# Patient Record
Sex: MALE | Employment: FULL TIME | URBAN - METROPOLITAN AREA
[De-identification: names, ages, dates, MRNs, and addresses within clinical notes are randomized per-mention and may not be internally consistent; named-entity substitution may affect disease eponyms.]

---

## 2017-02-16 DIAGNOSIS — Z20.828 EXPOSURE TO INFLUENZA: Primary | ICD-10-CM

## 2017-02-16 RX ORDER — OSELTAMIVIR PHOSPHATE 75 MG/1
75 CAPSULE ORAL DAILY
Qty: 10 CAPSULE | Refills: 0 | Status: SHIPPED | OUTPATIENT
Start: 2017-02-16 | End: 2017-02-26

## 2024-03-31 ENCOUNTER — HOSPITAL ENCOUNTER (OUTPATIENT)
Facility: CLINIC | Age: 34
Setting detail: OBSERVATION
Discharge: HOME | End: 2024-04-01
Attending: EMERGENCY MEDICINE | Admitting: INTERNAL MEDICINE
Payer: COMMERCIAL

## 2024-03-31 DIAGNOSIS — M62.830 SPASM OF MUSCLE OF LOWER BACK: ICD-10-CM

## 2024-03-31 DIAGNOSIS — M54.50 ACUTE MIDLINE LOW BACK PAIN WITHOUT SCIATICA: Primary | ICD-10-CM

## 2024-03-31 PROCEDURE — 99285 EMERGENCY DEPT VISIT HI MDM: CPT | Performed by: EMERGENCY MEDICINE

## 2024-03-31 PROCEDURE — 99285 EMERGENCY DEPT VISIT HI MDM: CPT | Mod: 25 | Performed by: EMERGENCY MEDICINE

## 2024-03-31 PROCEDURE — 96374 THER/PROPH/DIAG INJ IV PUSH: CPT | Performed by: EMERGENCY MEDICINE

## 2024-03-31 PROCEDURE — 250N000013 HC RX MED GY IP 250 OP 250 PS 637: Performed by: EMERGENCY MEDICINE

## 2024-03-31 PROCEDURE — 96361 HYDRATE IV INFUSION ADD-ON: CPT | Performed by: EMERGENCY MEDICINE

## 2024-03-31 PROCEDURE — G0378 HOSPITAL OBSERVATION PER HR: HCPCS

## 2024-03-31 PROCEDURE — 96375 TX/PRO/DX INJ NEW DRUG ADDON: CPT | Performed by: EMERGENCY MEDICINE

## 2024-03-31 PROCEDURE — 258N000003 HC RX IP 258 OP 636: Performed by: EMERGENCY MEDICINE

## 2024-03-31 PROCEDURE — 250N000011 HC RX IP 250 OP 636: Performed by: EMERGENCY MEDICINE

## 2024-03-31 RX ORDER — KETOROLAC TROMETHAMINE 15 MG/ML
10 INJECTION, SOLUTION INTRAMUSCULAR; INTRAVENOUS
Status: COMPLETED | OUTPATIENT
Start: 2024-03-31 | End: 2024-03-31

## 2024-03-31 RX ORDER — DEXAMETHASONE SODIUM PHOSPHATE 10 MG/ML
10 INJECTION, SOLUTION INTRAMUSCULAR; INTRAVENOUS ONCE
Status: COMPLETED | OUTPATIENT
Start: 2024-03-31 | End: 2024-03-31

## 2024-03-31 RX ORDER — SODIUM CHLORIDE, SODIUM LACTATE, POTASSIUM CHLORIDE, CALCIUM CHLORIDE 600; 310; 30; 20 MG/100ML; MG/100ML; MG/100ML; MG/100ML
1000 INJECTION, SOLUTION INTRAVENOUS CONTINUOUS
Status: DISCONTINUED | OUTPATIENT
Start: 2024-03-31 | End: 2024-04-01

## 2024-03-31 RX ORDER — DIAZEPAM 5 MG
5 TABLET ORAL
Status: COMPLETED | OUTPATIENT
Start: 2024-03-31 | End: 2024-03-31

## 2024-03-31 RX ORDER — OXYCODONE HYDROCHLORIDE 5 MG/1
5 TABLET ORAL EVERY 6 HOURS PRN
Qty: 10 TABLET | Refills: 0 | Status: SHIPPED | OUTPATIENT
Start: 2024-03-31 | End: 2024-04-01

## 2024-03-31 RX ORDER — PREDNISONE 20 MG/1
TABLET ORAL
Qty: 5 TABLET | Refills: 0 | Status: SHIPPED | OUTPATIENT
Start: 2024-03-31 | End: 2024-04-07

## 2024-03-31 RX ORDER — HYDROMORPHONE HYDROCHLORIDE 1 MG/ML
0.5 INJECTION, SOLUTION INTRAMUSCULAR; INTRAVENOUS; SUBCUTANEOUS EVERY 30 MIN PRN
Status: COMPLETED | OUTPATIENT
Start: 2024-03-31 | End: 2024-03-31

## 2024-03-31 RX ADMIN — HYDROMORPHONE HYDROCHLORIDE 0.5 MG: 1 INJECTION, SOLUTION INTRAMUSCULAR; INTRAVENOUS; SUBCUTANEOUS at 22:31

## 2024-03-31 RX ADMIN — SODIUM CHLORIDE, POTASSIUM CHLORIDE, SODIUM LACTATE AND CALCIUM CHLORIDE 1000 ML: 600; 310; 30; 20 INJECTION, SOLUTION INTRAVENOUS at 20:55

## 2024-03-31 RX ADMIN — DIAZEPAM 5 MG: 5 TABLET ORAL at 20:54

## 2024-03-31 RX ADMIN — KETOROLAC TROMETHAMINE 10 MG: 15 INJECTION, SOLUTION INTRAMUSCULAR; INTRAVENOUS at 20:55

## 2024-03-31 RX ADMIN — HYDROMORPHONE HYDROCHLORIDE 0.5 MG: 1 INJECTION, SOLUTION INTRAMUSCULAR; INTRAVENOUS; SUBCUTANEOUS at 22:03

## 2024-03-31 RX ADMIN — DEXAMETHASONE SODIUM PHOSPHATE 10 MG: 10 INJECTION, SOLUTION INTRAMUSCULAR; INTRAVENOUS at 20:54

## 2024-03-31 ASSESSMENT — ENCOUNTER SYMPTOMS
RESPIRATORY NEGATIVE: 1
CONSTITUTIONAL NEGATIVE: 1
BACK PAIN: 1
ALLERGIC/IMMUNOLOGIC NEGATIVE: 1
ENDOCRINE NEGATIVE: 1
GASTROINTESTINAL NEGATIVE: 1
PSYCHIATRIC NEGATIVE: 1
CARDIOVASCULAR NEGATIVE: 1
HEMATOLOGIC/LYMPHATIC NEGATIVE: 1
EYES NEGATIVE: 1
NEUROLOGICAL NEGATIVE: 1

## 2024-03-31 ASSESSMENT — ACTIVITIES OF DAILY LIVING (ADL)
ADLS_ACUITY_SCORE: 35
ADLS_ACUITY_SCORE: 33
ADLS_ACUITY_SCORE: 35
ADLS_ACUITY_SCORE: 33
ADLS_ACUITY_SCORE: 35

## 2024-03-31 ASSESSMENT — COLUMBIA-SUICIDE SEVERITY RATING SCALE - C-SSRS
6. HAVE YOU EVER DONE ANYTHING, STARTED TO DO ANYTHING, OR PREPARED TO DO ANYTHING TO END YOUR LIFE?: NO
2. HAVE YOU ACTUALLY HAD ANY THOUGHTS OF KILLING YOURSELF IN THE PAST MONTH?: NO
1. IN THE PAST MONTH, HAVE YOU WISHED YOU WERE DEAD OR WISHED YOU COULD GO TO SLEEP AND NOT WAKE UP?: NO

## 2024-03-31 NOTE — ED PROVIDER NOTES
History     Chief Complaint   Patient presents with    Back Pain     HPI  Kristian Dumont is a 33 year old male who presents with low back pain that started 7 hours after lifting a his daughter's bike off the garage wall by report    On examination patient was accompanied by his wife reporting that earlier this morning around 9 AM he was trying to retrieve his dog's bicycle from the garage when he bent over but was in the waiting and tweaked his back.  He has had a history of lumbar back pain but no prior surgery or epidural steroid injection.  He has methocarbamol at home to manage symptoms.  Patient reports since tweaking his back earlier this morning he has had back spasms.  No leg numbness or weakness and no fall onto his back.  He required assist with his spouse and children to help him get to the bathroom due to pain and spasm but was able to void without difficulty.  No perianal anesthesia.    Allergies:  No Known Allergies    Problem List:    There are no problems to display for this patient.       Past Medical History:    No past medical history on file.    Past Surgical History:    No past surgical history on file.    Family History:    No family history on file.    Social History:  Marital Status:   [2]        Medications:    oxyCODONE (ROXICODONE) 5 MG tablet  predniSONE (DELTASONE) 20 MG tablet          Review of Systems   Constitutional: Negative.    HENT: Negative.     Eyes: Negative.    Respiratory: Negative.     Cardiovascular: Negative.    Gastrointestinal: Negative.    Endocrine: Negative.    Genitourinary: Negative.    Musculoskeletal:  Positive for back pain (and back spasm).   Skin: Negative.    Allergic/Immunologic: Negative.    Neurological: Negative.    Hematological: Negative.    Psychiatric/Behavioral: Negative.     All other systems reviewed and are negative.      Physical Exam   BP: 121/82  Pulse: 74  Temp: 97  F (36.1  C)  Resp: 18  Weight: 99.8 kg (220 lb)  SpO2: 95  %      Physical Exam  Constitutional:       General: He is not in acute distress.     Appearance: He is not ill-appearing, toxic-appearing or diaphoretic.   HENT:      Head: Normocephalic and atraumatic.      Nose: Nose normal.   Eyes:      Extraocular Movements: Extraocular movements intact.      Pupils: Pupils are equal, round, and reactive to light.   Cardiovascular:      Pulses: Normal pulses.   Pulmonary:      Effort: Pulmonary effort is normal. No respiratory distress.      Breath sounds: Normal breath sounds. No stridor. No wheezing, rhonchi or rales.   Chest:      Chest wall: No tenderness.   Musculoskeletal:         General: Signs of injury present.      Cervical back: Normal range of motion and neck supple.      Thoracic back: Spasms and tenderness present.        Back:    Skin:     Capillary Refill: Capillary refill takes less than 2 seconds.      Coloration: Skin is not pale.      Findings: No bruising, erythema, lesion or rash.   Neurological:      General: No focal deficit present.      Mental Status: He is alert and oriented to person, place, and time.      Cranial Nerves: No cranial nerve deficit.      Sensory: No sensory deficit.      Motor: No weakness.      Coordination: Coordination normal.      Deep Tendon Reflexes: Reflexes normal.   Psychiatric:         Mood and Affect: Mood normal.         Behavior: Behavior normal.         Thought Content: Thought content normal.         Judgment: Judgment normal.         ED Course        Procedures              Critical Care time:  none               ED medications:  Medications   lactated ringers infusion 1,000 mL (has no administration in time range)   HYDROmorphone (PF) (DILAUDID) injection 0.5 mg (0.5 mg Intravenous $Given 3/31/24 2203)   lactated ringers BOLUS 1,000 mL (1,000 mLs Intravenous $New Bag 3/31/24 2055)   dexAMETHasone PF (DECADRON) injection 10 mg (10 mg Intravenous $Given 3/31/24 2054)   ketorolac (TORADOL) injection 10 mg (10 mg  Intravenous $Given 3/31/24 2055)   diazepam (VALIUM) tablet 5 mg (5 mg Oral $Given 3/31/24 2054)        ED Vitals:  Vitals:    03/31/24 1636   BP: 121/82   Pulse: 74   Resp: 18   Temp: 97  F (36.1  C)   TempSrc: Tympanic   SpO2: 95%   Weight: 99.8 kg (220 lb)      ED labs and imaging: none      Assessments & Plan (with Medical Decision Making)   Assessment summary and Clinical Impression: 33-year-old male who presented with acute low back pain after lifting his daughter's bicycle of the garage wall.  He afebrile and otherwise hemodynamically normal on arrival.  Patient reported that he has had back spasm with back pain for quite some time but no prior epidural steroid injection or back surgery.  When he has back spasms or flares he typically manages symptoms methocarbamol.  Since this morning 9 AM he has had persistent spasm and pain.  No lower extremity weakness or numbness.  He voided spontaneously without difficulty on exam there is reproducible.  Thoracolumbar tenderness.  Mild.  There is no rash.  He has equal and symmetric lower extremity strength we agreed to give him medications to manage his back spasm and pain. Suspicion that he has a emergent spinal epidural process was low  We agreed that there was no indication for spine imaging.  After medications given to manage symptoms he expressed comfort trialing care at home but ultimately was not able to go home because his wife was worried about fall risk and patient was not able to ambulate independently due to pain and spasm.  He is admitted to medicine for pain management plan with discussion about thoracolumbar imaging depending on patient's hospital course and symptoms.    At shift end patient was awaiting transfer to the medical floor for on-going care.    ED course and Plan:  Reviewed the medical record.  Patient was offered medications to manage his back spasm and pain.  Given absence of red flags including no fall, no extremity weakness or numbness no  fever or chills and no perianal anesthesia he was offered medications to manage his symptoms. After therapies given to manage his back pain and spasm patient was able to pivot in bed and expressed that he was comfortable going home with plan to trial care at home with pain management plan including Tylenol, methocarbamol, Motrin or ibuprofen, oxycodone and a prednisone taper.  He reported that he has lidocaine patches at home.  To help with follow-up care if needed I placed a referral to the spine clinic for reassessment in the next 1 to 2 weeks if symptoms persist.  Reviewed concerning symptoms including leg weakness with numbness, perianal anesthesia, difficulty urinating or defecating or new concerns or fever.  Patient and spouse expressed comfort, understanding and agreement with plan of care.      ADDENDUM- on 3/31/24- patient was readied for discharge but continued to have spasm . He was able to pivot in bed and stand but was apprehensive about walking. Spouse expressed reservation about taking patient home and his risk for fall.  His pain and spasms improved but he was still admitted to walk off the bed but was able to pivot and stand up.  We ultimately agreed to admit him for further care due to fall risk.    Spoke with ROBERT Garland PA-C- admitting provider at 11.10pm who agreed to accept patient for further care. We reviewed rationale for admission including pain management needs and consideration of MRI imaging of the thoracolumbar spine to assess for disc herniation in the a.m. if persistent symptoms.  Plan is for the admitting team or provider to decide on imaging depending on hospital course of care.     At shift end patient signed out to the overnight provider with patient awaiting transfer to the medical floor for further care.      Disclaimer: This note consists of symbols derived from keyboarding, dictation and/or voice recognition software. As a result, there may be errors in the script that have  gone undetected. Please consider this when interpreting information found in this chart.   I have reviewed the nursing notes.    I have reviewed the findings, diagnosis, plan and need for follow up with the patient.           Medical Decision Making  The patient's presentation was of moderate complexity (acute low back pain).    The patient's evaluation involved:  history and exam without other MDM data elements    The patient's management necessitated moderate risk (prescription drug management including medications given in the ED).        New Prescriptions    OXYCODONE (ROXICODONE) 5 MG TABLET    Take 1 tablet (5 mg) by mouth every 6 hours as needed for severe pain    PREDNISONE (DELTASONE) 20 MG TABLET    1 tab daily for 3 days, then 1/2 tab daily for 4 days       Final diagnoses:   Acute midline low back pain without sciatica   Spasm of muscle of lower back       3/31/2024   [facility] EMERGENCY DEPT       Vasu Skinner MD  03/31/24 1161       Vasu Skinner MD  03/31/24 5584

## 2024-03-31 NOTE — ED TRIAGE NOTES
Lower back pain that started approximately 7 hours ago after lifting daughter's bike off of garage wall.       Triage Assessment (Adult)       Row Name 03/31/24 7680          Triage Assessment    Airway WDL WDL        Respiratory WDL    Respiratory WDL WDL        Skin Circulation/Temperature WDL    Skin Circulation/Temperature WDL WDL        Cardiac WDL    Cardiac WDL WDL        Peripheral/Neurovascular WDL    Peripheral Neurovascular WDL WDL        Cognitive/Neuro/Behavioral WDL    Cognitive/Neuro/Behavioral WDL WDL

## 2024-04-01 ENCOUNTER — APPOINTMENT (OUTPATIENT)
Dept: MRI IMAGING | Facility: CLINIC | Age: 34
End: 2024-04-01
Attending: INTERNAL MEDICINE
Payer: COMMERCIAL

## 2024-04-01 VITALS
TEMPERATURE: 98 F | BODY MASS INDEX: 29.89 KG/M2 | DIASTOLIC BLOOD PRESSURE: 76 MMHG | SYSTOLIC BLOOD PRESSURE: 132 MMHG | OXYGEN SATURATION: 94 % | HEART RATE: 96 BPM | HEIGHT: 72 IN | WEIGHT: 220.68 LBS | RESPIRATION RATE: 18 BRPM

## 2024-04-01 PROBLEM — M51.26 LUMBAR DISC HERNIATION: Status: ACTIVE | Noted: 2024-04-01

## 2024-04-01 LAB
ANION GAP SERPL CALCULATED.3IONS-SCNC: 8 MMOL/L (ref 7–15)
BUN SERPL-MCNC: 14.1 MG/DL (ref 6–20)
CALCIUM SERPL-MCNC: 9.5 MG/DL (ref 8.6–10)
CHLORIDE SERPL-SCNC: 103 MMOL/L (ref 98–107)
CREAT SERPL-MCNC: 0.77 MG/DL (ref 0.67–1.17)
DEPRECATED HCO3 PLAS-SCNC: 28 MMOL/L (ref 22–29)
EGFRCR SERPLBLD CKD-EPI 2021: >90 ML/MIN/1.73M2
ERYTHROCYTE [DISTWIDTH] IN BLOOD BY AUTOMATED COUNT: 12.3 % (ref 10–15)
GLUCOSE SERPL-MCNC: 159 MG/DL (ref 70–99)
HCT VFR BLD AUTO: 46.8 % (ref 40–53)
HGB BLD-MCNC: 16.6 G/DL (ref 13.3–17.7)
MCH RBC QN AUTO: 30.2 PG (ref 26.5–33)
MCHC RBC AUTO-ENTMCNC: 35.5 G/DL (ref 31.5–36.5)
MCV RBC AUTO: 85 FL (ref 78–100)
PLATELET # BLD AUTO: 259 10E3/UL (ref 150–450)
POTASSIUM SERPL-SCNC: 4.3 MMOL/L (ref 3.4–5.3)
RBC # BLD AUTO: 5.49 10E6/UL (ref 4.4–5.9)
SODIUM SERPL-SCNC: 139 MMOL/L (ref 135–145)
WBC # BLD AUTO: 12.1 10E3/UL (ref 4–11)

## 2024-04-01 PROCEDURE — G0378 HOSPITAL OBSERVATION PER HR: HCPCS

## 2024-04-01 PROCEDURE — 99207 PR APP CREDIT; MD BILLING SHARED VISIT: CPT | Mod: FS | Performed by: STUDENT IN AN ORGANIZED HEALTH CARE EDUCATION/TRAINING PROGRAM

## 2024-04-01 PROCEDURE — 99207 PR APP CREDIT; MD BILLING SHARED VISIT: CPT

## 2024-04-01 PROCEDURE — 250N000013 HC RX MED GY IP 250 OP 250 PS 637

## 2024-04-01 PROCEDURE — 72148 MRI LUMBAR SPINE W/O DYE: CPT

## 2024-04-01 PROCEDURE — 250N000013 HC RX MED GY IP 250 OP 250 PS 637: Performed by: INTERNAL MEDICINE

## 2024-04-01 PROCEDURE — 250N000012 HC RX MED GY IP 250 OP 636 PS 637: Performed by: INTERNAL MEDICINE

## 2024-04-01 PROCEDURE — 80048 BASIC METABOLIC PNL TOTAL CA: CPT

## 2024-04-01 PROCEDURE — 96376 TX/PRO/DX INJ SAME DRUG ADON: CPT

## 2024-04-01 PROCEDURE — 85027 COMPLETE CBC AUTOMATED: CPT

## 2024-04-01 PROCEDURE — 36415 COLL VENOUS BLD VENIPUNCTURE: CPT

## 2024-04-01 PROCEDURE — 250N000011 HC RX IP 250 OP 636: Performed by: EMERGENCY MEDICINE

## 2024-04-01 PROCEDURE — 258N000003 HC RX IP 258 OP 636: Performed by: EMERGENCY MEDICINE

## 2024-04-01 PROCEDURE — 250N000011 HC RX IP 250 OP 636: Performed by: INTERNAL MEDICINE

## 2024-04-01 PROCEDURE — 99222 1ST HOSP IP/OBS MODERATE 55: CPT | Mod: VID | Performed by: INTERNAL MEDICINE

## 2024-04-01 RX ORDER — METHOCARBAMOL 500 MG/1
500 TABLET, FILM COATED ORAL 4 TIMES DAILY PRN
Status: DISCONTINUED | OUTPATIENT
Start: 2024-04-01 | End: 2024-04-01 | Stop reason: HOSPADM

## 2024-04-01 RX ORDER — ONDANSETRON 4 MG/1
4 TABLET, ORALLY DISINTEGRATING ORAL EVERY 6 HOURS PRN
Status: DISCONTINUED | OUTPATIENT
Start: 2024-04-01 | End: 2024-04-01 | Stop reason: HOSPADM

## 2024-04-01 RX ORDER — METHOCARBAMOL 500 MG/1
500 TABLET, FILM COATED ORAL 4 TIMES DAILY PRN
COMMUNITY

## 2024-04-01 RX ORDER — OXYCODONE HYDROCHLORIDE 5 MG/1
5-10 TABLET ORAL EVERY 6 HOURS PRN
Qty: 20 TABLET | Refills: 0 | Status: SHIPPED | OUTPATIENT
Start: 2024-04-01

## 2024-04-01 RX ORDER — KETOROLAC TROMETHAMINE 30 MG/ML
30 INJECTION, SOLUTION INTRAMUSCULAR; INTRAVENOUS EVERY 6 HOURS PRN
Status: DISCONTINUED | OUTPATIENT
Start: 2024-04-01 | End: 2024-04-01 | Stop reason: HOSPADM

## 2024-04-01 RX ORDER — IBUPROFEN 600 MG/1
600 TABLET, FILM COATED ORAL EVERY 6 HOURS PRN
Qty: 60 TABLET | Refills: 0 | Status: SHIPPED | OUTPATIENT
Start: 2024-04-01

## 2024-04-01 RX ORDER — NALOXONE HYDROCHLORIDE 0.4 MG/ML
0.2 INJECTION, SOLUTION INTRAMUSCULAR; INTRAVENOUS; SUBCUTANEOUS
Status: DISCONTINUED | OUTPATIENT
Start: 2024-04-01 | End: 2024-04-01 | Stop reason: HOSPADM

## 2024-04-01 RX ORDER — HYDROMORPHONE HYDROCHLORIDE 1 MG/ML
0.5 INJECTION, SOLUTION INTRAMUSCULAR; INTRAVENOUS; SUBCUTANEOUS
Status: DISCONTINUED | OUTPATIENT
Start: 2024-04-01 | End: 2024-04-01 | Stop reason: HOSPADM

## 2024-04-01 RX ORDER — CALCIUM CARBONATE 500 MG/1
1000 TABLET, CHEWABLE ORAL 4 TIMES DAILY PRN
Status: DISCONTINUED | OUTPATIENT
Start: 2024-04-01 | End: 2024-04-01

## 2024-04-01 RX ORDER — AMOXICILLIN 250 MG
2 CAPSULE ORAL 2 TIMES DAILY PRN
Status: DISCONTINUED | OUTPATIENT
Start: 2024-04-01 | End: 2024-04-01 | Stop reason: HOSPADM

## 2024-04-01 RX ORDER — ACETAMINOPHEN 325 MG/1
650 TABLET ORAL EVERY 4 HOURS PRN
Status: DISCONTINUED | OUTPATIENT
Start: 2024-04-01 | End: 2024-04-01 | Stop reason: HOSPADM

## 2024-04-01 RX ORDER — OXYCODONE HYDROCHLORIDE 5 MG/1
10 TABLET ORAL ONCE
Status: COMPLETED | OUTPATIENT
Start: 2024-04-01 | End: 2024-04-01

## 2024-04-01 RX ORDER — ONDANSETRON 2 MG/ML
4 INJECTION INTRAMUSCULAR; INTRAVENOUS EVERY 6 HOURS PRN
Status: DISCONTINUED | OUTPATIENT
Start: 2024-04-01 | End: 2024-04-01 | Stop reason: HOSPADM

## 2024-04-01 RX ORDER — NALOXONE HYDROCHLORIDE 0.4 MG/ML
0.4 INJECTION, SOLUTION INTRAMUSCULAR; INTRAVENOUS; SUBCUTANEOUS
Status: DISCONTINUED | OUTPATIENT
Start: 2024-04-01 | End: 2024-04-01 | Stop reason: HOSPADM

## 2024-04-01 RX ORDER — PANTOPRAZOLE SODIUM 40 MG/1
40 TABLET, DELAYED RELEASE ORAL DAILY
Status: DISCONTINUED | OUTPATIENT
Start: 2024-04-01 | End: 2024-04-01 | Stop reason: HOSPADM

## 2024-04-01 RX ORDER — AMOXICILLIN 250 MG
1 CAPSULE ORAL 2 TIMES DAILY PRN
Status: DISCONTINUED | OUTPATIENT
Start: 2024-04-01 | End: 2024-04-01 | Stop reason: HOSPADM

## 2024-04-01 RX ORDER — PREDNISONE 10 MG/1
10 TABLET ORAL DAILY
Status: DISCONTINUED | OUTPATIENT
Start: 2024-04-01 | End: 2024-04-01 | Stop reason: HOSPADM

## 2024-04-01 RX ORDER — ACETAMINOPHEN 325 MG/1
650 TABLET ORAL EVERY 4 HOURS PRN
Qty: 60 TABLET | Refills: 0 | Status: SHIPPED | OUTPATIENT
Start: 2024-04-01

## 2024-04-01 RX ORDER — LORAZEPAM 2 MG/ML
1 INJECTION INTRAMUSCULAR EVERY 4 HOURS PRN
Status: DISCONTINUED | OUTPATIENT
Start: 2024-04-01 | End: 2024-04-01

## 2024-04-01 RX ORDER — LORAZEPAM 1 MG/1
1 TABLET ORAL EVERY 4 HOURS PRN
Status: DISCONTINUED | OUTPATIENT
Start: 2024-04-01 | End: 2024-04-01

## 2024-04-01 RX ORDER — ACETAMINOPHEN 325 MG/1
650 TABLET ORAL EVERY 4 HOURS PRN
Status: DISCONTINUED | OUTPATIENT
Start: 2024-04-01 | End: 2024-04-01

## 2024-04-01 RX ADMIN — METHOCARBAMOL 500 MG: 500 TABLET ORAL at 08:58

## 2024-04-01 RX ADMIN — KETOROLAC TROMETHAMINE 30 MG: 30 INJECTION, SOLUTION INTRAMUSCULAR; INTRAVENOUS at 05:02

## 2024-04-01 RX ADMIN — PANTOPRAZOLE SODIUM 40 MG: 40 TABLET, DELAYED RELEASE ORAL at 08:58

## 2024-04-01 RX ADMIN — ACETAMINOPHEN 650 MG: 325 TABLET, FILM COATED ORAL at 12:54

## 2024-04-01 RX ADMIN — HYDROMORPHONE HYDROCHLORIDE 1 MG: 1 INJECTION, SOLUTION INTRAMUSCULAR; INTRAVENOUS; SUBCUTANEOUS at 01:01

## 2024-04-01 RX ADMIN — SODIUM CHLORIDE, POTASSIUM CHLORIDE, SODIUM LACTATE AND CALCIUM CHLORIDE 1000 ML: 600; 310; 30; 20 INJECTION, SOLUTION INTRAVENOUS at 03:47

## 2024-04-01 RX ADMIN — PREDNISONE 10 MG: 10 TABLET ORAL at 08:58

## 2024-04-01 RX ADMIN — ACETAMINOPHEN 650 MG: 325 TABLET, FILM COATED ORAL at 05:02

## 2024-04-01 RX ADMIN — OXYCODONE HYDROCHLORIDE 10 MG: 5 TABLET ORAL at 12:54

## 2024-04-01 RX ADMIN — KETOROLAC TROMETHAMINE 30 MG: 30 INJECTION, SOLUTION INTRAMUSCULAR; INTRAVENOUS at 12:55

## 2024-04-01 ASSESSMENT — ACTIVITIES OF DAILY LIVING (ADL)
ADLS_ACUITY_SCORE: 20
ADLS_ACUITY_SCORE: 22
ADLS_ACUITY_SCORE: 23
ADLS_ACUITY_SCORE: 23
ADLS_ACUITY_SCORE: 22
ADLS_ACUITY_SCORE: 23
ADLS_ACUITY_SCORE: 20
ADLS_ACUITY_SCORE: 20
ADLS_ACUITY_SCORE: 22
ADLS_ACUITY_SCORE: 20
ADLS_ACUITY_SCORE: 20
ADLS_ACUITY_SCORE: 22
ADLS_ACUITY_SCORE: 22

## 2024-04-01 NOTE — PROGRESS NOTES
[place] [facility] [place]    Patient discharged to home at 2:35 PM via wheel chair. Patient reported adequate pain control stating pain improved since admission and positioning from lying/sitting to standing was more tolerable. Discussed pain management plan for home with patient and spouse during discharge education. Patient was able to ambulate to bathroom and out to stringer placing self in wheelchair with standy by assist. Patient was accompanied by spouse and staff and discharge instructions were reviewed with patient and spouse, opportunity offered to ask questions. Prescriptions sent to patients preferred pharmacy. All belongings sent with patient.     Jessica Avitia RN

## 2024-04-01 NOTE — PLAN OF CARE
Problem: Adult Inpatient Plan of Care  Goal: Absence of Hospital-Acquired Illness or Injury  Intervention: Identify and Manage Fall Risk  Recent Flowsheet Documentation  Taken 4/1/2024 0500 by Juan R Sparrow RN  Safety Promotion/Fall Prevention:   activity supervised   lighting adjusted   mobility aid in reach   nonskid shoes/slippers when out of bed  Intervention: Prevent Skin Injury  Recent Flowsheet Documentation  Taken 4/1/2024 0500 by Juan R Sparrow RN  Body Position: position changed independently  Intervention: Prevent and Manage VTE (Venous Thromboembolism) Risk  Recent Flowsheet Documentation  Taken 4/1/2024 0500 by Juan R Sparrow RN  VTE Prevention/Management: SCDs (sequential compression devices) off  Intervention: Prevent Infection  Recent Flowsheet Documentation  Taken 4/1/2024 0500 by Juan R Sparrow RN  Infection Prevention:   environmental surveillance performed   hand hygiene promoted   rest/sleep promoted  Goal: Optimal Comfort and Wellbeing  Intervention: Monitor Pain and Promote Comfort  Recent Flowsheet Documentation  Taken 4/1/2024 0359 by Juan R Sparrow RN  Pain Management Interventions:   emotional support   rest   repositioned   medication (see MAR)  Taken 4/1/2024 0116 by Juan R Sparrow RN  Pain Management Interventions:   emotional support   rest   repositioned   medication (see MAR)     Problem: Pain Acute  Goal: Optimal Pain Control and Function  Intervention: Develop Pain Management Plan  Recent Flowsheet Documentation  Taken 4/1/2024 0359 by Juan R Sparrow RN  Pain Management Interventions:   emotional support   rest   repositioned   medication (see MAR)  Taken 4/1/2024 0116 by Juan R Sparrow RN  Pain Management Interventions:   emotional support   rest   repositioned   medication (see MAR)  Intervention: Prevent or Manage Pain  Recent Flowsheet Documentation  Taken 4/1/2024 0500 by Juan R Sparrow RN  Medication Review/Management: medications reviewed       Goal Outcome Evaluation:    Patient  alert and oriented x4. Stand by assist of one with transfer. Patient mobility moderately impaired due to shooting pain in low mid back 7/10 with movement. PRN toradol, dilaudid and tylenol alternated overnight for pain management, heat pack provided. Patient on regular diet. PIV on R AC SL, CDI. Urinal at bedside. VS every four hours. Lumbar spine MRI ordered in AM. PCDs on. Will continue to monitor.    Juan R Sparrow RN

## 2024-04-01 NOTE — DISCHARGE SUMMARY
"[facility]  Discharge Summary  Hospital Medicine       Date of Admission:  3/31/2024  Date of Discharge:  4/1/2024  Discharging Provider: Armando Simon PA-C      Identification and Chief Compaint: Kristian Dumont is a 33 year old male who presented on 3/31/2024 with complaint of acute onset low back pain.    Discharge Diagnoses   Principal Problem:    Lumbar disc herniation  Active Problems:    Acute midline low back pain without sciatica    Spasm of muscle of lower back       Follow-ups Needed After Discharge   Follow-up Appointments     Follow-up and recommended labs and tests       Follow up with primary care provider, [facility], within 7   days for hospital follow- up.  No follow up labs or test are needed.  Follow up with PT and Ortho.        Additional follow-up instructions/to-do's for PCP: referred to PT and ortho. CBC for leukocytosis that was probably steroid induced.    Unresulted Labs Ordered in the Past 30 Days of this Admission       No orders found for last 31 day(s).          Hospital Course   Kristian Dumont is a 33 year old male with a past medical hx of GERD admitted on 3/31/2024. He presented with acute lower back pain after lifting his daughters bike out of the garage. No red flag symptoms, but stated his left lower leg felt \"weird\" but denied numbness/tingling or weakness. Has a hx of LBP 5yrs ago and was told he tore a ligament. This has limited his exercise capacity since then. MRI showed central disc protrusion at L5-S1. He will be discharged with a Prednisone taper, Oxycodone, alternating Tylenol and Ibuprofen, and Methocarbamol. Referral for PT and ortho sent.     Lumbar disc herniation of L5-S1  He presented with lower back pain after lifting his daughter's bike out of the garage. States he felt a pop and jolt. Pain is sharp and midline. No radiculopathy, fever, paraspinal tenderness, IVDA. Endorses his left lower leg feeling \"weird\" but denies " numbness/tingling and weakness. He has a hx of LBP 5yrs ago where he was told he had a torn ligament. This never fully resolved and currently limits his exercise (previously did ultra marathons). He was given extensive treatment in the ED with narcotics and steroids, but when he tried to leave his pain was still too much, so he was admitted for pain management. MRI showed a central disc protrusion at L5-S1 with central annular fissuring of the disc. He was given 10mg IV Dexamethasone in the ER and will taper with PO Prednisone.    - Oxycodone 5mg 1-2 tablets Q6h  - Alternate Tylenol and Ibuprofen  - Continue PTA Methocarbamol 500mg QID PRN  - Prednisone PO 20mg for 3d then 10mg for 4d  - Referrals sent to ortho and PT     GERD  Continued PTA Omeprazole 20          Leukocytosis  WBC 12.1 on 4/1 am. Likely 2/2 to steroids that were given in ER. No signs of infection. Afebrile.     Consultations This Hospital Stay   None    Code Status   Full Code    The discharge plan was discussed with the patient, his wife, and they expressed understanding.     Time Spent on this Encounter   Total time on this discharge was 35 minutes.       Armando Simon PA-C  [facility]  ______________________________________________________________________    Physical Exam   Vital Signs: Temp: 98  F (36.7  C) Temp src: Oral BP: 132/76 Pulse: 96   Resp: 18 SpO2: 94 % O2 Device: None (Room air)    Weight: 220 lbs 10.89 oz    Constitutional: Alert, oriented, cooperative, in no acute distress, appears nontoxic.  HENT: Oropharynx is clear. No evidence of cranial trauma. Normocephalic. Eyes anicteric.   Cardiovascular: Regular rate and rhythm, normal S1 and S2, and no murmur noted. No lower extremity edema.  Respiratory: Clear to auscultation bilaterally with no adventitious breath sounds.   GI: Soft, non-tender, normal bowel sounds, no hepatomegaly, no masses. No CVA tenderness.  Musculoskeletal: Normal muscle bulk and  tone. FROM in all extremities. 5/5 strength bilaterally. No paraspinal tenderness.  Skin: Warm and dry, no rashes.   Neurologic: Cranial nerves 2-12 are grossly intact.        Primary Care Physician   [facility]  [id number] [id number]     Discharge Disposition   Discharged to home  Condition at discharge: Stable    Significant Results and Procedures   Most Recent 3 CBC's:  Recent Labs   Lab Test 04/01/24  0636   WBC 12.1*   HGB 16.6   MCV 85        Most Recent 3 BMP's:  Recent Labs   Lab Test 04/01/24  0636      POTASSIUM 4.3   CHLORIDE 103   CO2 28   BUN 14.1   CR 0.77   ANIONGAP 8   CONOR 9.5   *   ,   Results for orders placed or performed during the hospital encounter of 03/31/24   MRI Lumbar Spine w/o Contrast    Narrative    MRI LUMBAR SPINE WITHOUT CONTRAST  April 1, 2024 9:59 AM     HISTORY: Acute lower back pain.     TECHNIQUE: Multiplanar multisequence MRI of the lumbar spine without  contrast.     COMPARISON: None.     FINDINGS: There are five lumbar-type vertebral bodies assumed for the  purposes of this dictation. The distal spinal cord and cauda equina  appear normal.     Alignment of the lumbar spine is normal. No loss of vertebral body  height. There are no destructive osseous lesions. No STIR hyperintense  endplate edema (Modic type I changes).    Level by level as follows:     T12-L1: No loss of disc height. No significant disc herniation. Normal  facets. No spinal canal or neural foraminal narrowing.     L1-L2: No loss of disc height. No significant disc herniation. Normal  facets. No spinal canal or neural foraminal narrowing.     L2-L3: No loss of disc height. No significant disc herniation. Normal  facets. No spinal canal or neural foraminal narrowing.     L3-L4: No loss of disc height. No significant disc herniation. Normal  facets. No spinal canal or neural foraminal narrowing.     L4-L5: No loss of disc height. No significant disc herniation.  Normal  facets. No spinal canal or neural foraminal narrowing.     L5-S1: Mild loss of disc height and signal. Central disc protrusion.  Central annular fissuring of the disc. Normal facets. No significant  spinal canal narrowing. No neural foraminal narrowing.     Paraspinous soft tissues: Unremarkable.       Impression    IMPRESSION:    1. Central disc protrusion at L5-S1 with central annular fissuring of  the disc. No significant spinal canal or neural foraminal narrowing at  this level.  2. No other significant degenerative changes in the lumbar spine.    ANSHUL MENDEZ MD         SYSTEM ID:  [id number]     Procedures  None    Discharge Orders      Spine  Referral      Physical Therapy  Referral      Orthopedic  Referral      Reason for your hospital stay    You were hospitalized for a disc herniation at L5-S1. Please follow up with your PCP, PT, and ortho.     Follow-up and recommended labs and tests     Follow up with primary care provider, [facility], within 7 days for hospital follow- up.  No follow up labs or test are needed.  Follow up with PT and Ortho.     Activity    Your activity upon discharge: activity as tolerated     Diet    Follow this diet upon discharge: Orders Placed This Encounter      Regular Diet Adult     Discharge Medications   Current Discharge Medication List        START taking these medications    Details   acetaminophen (TYLENOL) 325 MG tablet Take 2 tablets (650 mg) by mouth every 4 hours as needed for mild pain . Alternate with Tylenol and Ibuprofen  Qty: 60 tablet, Refills: 0    Associated Diagnoses: Acute midline low back pain without sciatica      ibuprofen (ADVIL/MOTRIN) 600 MG tablet Take 1 tablet (600 mg) by mouth every 6 hours as needed for moderate pain . Alternate Tylenol and Ibuprofen  Qty: 60 tablet, Refills: 0    Associated Diagnoses: Acute midline low back pain without sciatica      oxyCODONE (ROXICODONE) 5 MG tablet Take 1-2  tablets (5-10 mg) by mouth every 6 hours as needed for severe pain  Qty: 20 tablet, Refills: 0    Associated Diagnoses: Acute midline low back pain without sciatica      predniSONE (DELTASONE) 20 MG tablet 1 tab daily for 3 days, then 1/2 tab daily for 4 days  Qty: 5 tablet, Refills: 0           CONTINUE these medications which have NOT CHANGED    Details   methocarbamol (ROBAXIN) 500 MG tablet Take 500 mg by mouth 4 times daily as needed for muscle spasms      omeprazole (PRILOSEC) 20 MG DR capsule Take 20 mg by mouth daily           Allergies   No Known Allergies

## 2024-04-01 NOTE — ED NOTES
[facility]   Admission Handoff    The patient is Kristian Dumont, 33 year old who arrived in the ED by WHEELCHAIR from home with a complaint of Back Pain  . The patient's current symptoms are new and during this time the symptoms have decreased. In the ED, patient was diagnosed with   Final diagnoses:   Acute midline low back pain without sciatica   Spasm of muscle of lower back         Needed?: No    Allergies:  No Known Allergies    Past Medical Hx: No past medical history on file.    Initial vitals were: BP: 121/82  Pulse: 74  Temp: 97  F (36.1  C)  Resp: 18  Weight: 99.8 kg (220 lb)  SpO2: 95 %   Recent vital Signs: /82   Pulse 74   Temp 97  F (36.1  C) (Tympanic)   Resp 18   Wt 99.8 kg (220 lb)   SpO2 95%     Elimination Status: Continent: Yes     Activity Level: SBA    Fall Status: Reason for falls risk:  Mobility and Reason for falls risk: High Risk Medications  nonskid shoes/slippers when out of bed, arm band in place, patient and family education, and assistive device/personal items within reach    Baseline Mental status: WDL  Current Mental Status changes: at basesline    Infection present or suspected this encounter: no  Sepsis suspected: No    Isolation type: n/a    Bariatric equipment needed?: No    In the ED these meds were given:   Medications   lactated ringers infusion 1,000 mL (has no administration in time range)   lactated ringers BOLUS 1,000 mL (0 mLs Intravenous Stopped 3/31/24 2228)   dexAMETHasone PF (DECADRON) injection 10 mg (10 mg Intravenous $Given 3/31/24 2054)   HYDROmorphone (PF) (DILAUDID) injection 0.5 mg (0.5 mg Intravenous $Given 3/31/24 2231)   ketorolac (TORADOL) injection 10 mg (10 mg Intravenous $Given 3/31/24 2055)   diazepam (VALIUM) tablet 5 mg (5 mg Oral $Given 3/31/24 2054)       Drips running?  No    Home pump  No    Current LDAs: Peripheral IV: Site right AC; Gauge 20g  lactated Ringer's     Results:   Labs/Imaging  Ordered  and Resulted from Time of ED Arrival Up to the Time of Departure from the ED  No results found for this or any previous visit (from the past 24 hour(s)).    For the majority of the shift this patient's behavior was Green     Cardiac Rhythm: N/A  Pt needs tele? No  Skin/wound Issues: None    Code Status: Full Code    Pain control: fair    Nausea control: pt had none    Abnormal labs/tests/findings requiring intervention: intractable pain    Patient tested for COVID 19 prior to admission: NO     OBS brochure/video discussed/provided to patient/family: N/A     Family present during ED course? No     Family Comments/Social Situation comments: wife at bedside    Tasks needing completion: None    Sheyla Yip RN

## 2024-04-01 NOTE — H&P
[facility]    History and Physical - Hospitalist Service       Date of Admission:  3/31/2024    Assessment & Plan      Kristian Dumont is a 33 year old male admitted on 3/31/2024. He presented with acute lower back pain.     Lower back pain/spasm  He presented with lower back pain after straining it lifting his daughter's bike off the garage. He was given extensive treatment in the ED with narcotics and steroids, but when he tried to leave his pain was still too much.   - observation status  - c/w with tiered analgesics  - c/w prednisone  - MRI of lumbar spine    GERD  - c/w prednisone     Diet: Regular Diet Adult    DVT Prophylaxis: Low Risk/Ambulatory with no VTE prophylaxis indicated  Saucedo Catheter: Not present  Lines: None     Code Status: Full Code      Clinically Significant Risk Factors Present on Admission                       # Overweight: Estimated body mass index is 29.93 kg/m  as calculated from the following:    Height as of this encounter: 1.829 m (6').    Weight as of this encounter: 100.1 kg (220 lb 10.9 oz).              Disposition Plan      Expected Discharge Date: 04/01/2024                The patient's care was discussed with the Bedside Nurse and Patient.        Willie Smith MD  [facility]  Securely message with the Vocera Web Console (learn more here)  Text page via [employer] Paging/Directory      Visit/Communication Style   Virtual (Video) communication was used to evaluate Kristian.  Kristian consented to the use of video communication: yes  Video START time: 0450, 4/1/2024  Video STOP time: 0510, 4/1/2024   Patient's location: [facility]   Provider's location during the visit: [facility] Tele-medicine site        ______________________________________________________________________    Chief Complaint   Lower back pain    History is obtained from the patient    History of Present Illness   Kristian Dumont is a 33  year old male who presented to the ED with back pain. He states that he first hurt his back 6 years ago when he tried to lower a small boat off the side of another boat. He had pain in his lower back with no radiation and that resolved with time. Yesterday he was trying lift his daughter's bike up onto the wall and he had acute onset of lower back pain in the same location. It was midline and non-radiating. It was 10/10, and it was difficult to walk. He tried to take some methocarbamol with no affect. He came to the ED for evaluation and received dilaudid and dexamethasone with some improvement but he was still unable to walk without intense pain, so he was brought in for observation.     Review of Systems    General: negative for fever, chills, sweats, weakness  Eyes: negative for blurred vision, loss of vision  Ear Nose and Throat: negative for pharyngitis, speech or swallowing difficulties  Respiratory:  negative for sputum production, wheezing, LUCAS, pleuritic pain, sob or cough  Cardiology:  negative for chest pain, palpitations, orthopnea, PND, edema, syncope   Gastrointestinal: negative for abdominal pain, nausea, vomiting, diarrhea, constipation, hematemesis, melena or hematochezia  Genitourinary: negative for frequency, urgency, dysuria, hematuria   Neurological: negative for focal weakness, paresthesia    Past Medical History    I have reviewed this patient's medical history and updated it with pertinent information if needed.   Past Medical History:   Diagnosis Date    Chronic lower back pain     Gastroesophageal reflux disease with esophagitis        Past Surgical History   I have reviewed this patient's surgical history and updated it with pertinent information if needed.  No past surgical history on file.    Social History   I have reviewed this patient's social history and updated it with pertinent information if needed.  Social History     Tobacco Use    Smoking status: Never     Passive exposure:  Never    Smokeless tobacco: Never   Substance Use Topics    Alcohol use: Yes     Alcohol/week: 12.0 standard drinks of alcohol     Types: 12 Standard drinks or equivalent per week    Drug use: Never       Family History   I have reviewed this patient's family history and updated it with pertinent information if needed.  Family History   Problem Relation Age of Onset    Chronic Obstructive Pulmonary Disease Mother     Coronary Artery Disease Father          Prior to Admission Medications   Prior to Admission Medications   Prescriptions Last Dose Informant Patient Reported? Taking?   methocarbamol (ROBAXIN) 500 MG tablet 3/31/2024  Yes Yes   Sig: Take 500 mg by mouth 4 times daily as needed for muscle spasms   omeprazole (PRILOSEC) 20 MG DR capsule 3/31/2024  Yes Yes   Sig: Take 20 mg by mouth daily      Facility-Administered Medications: None     Allergies   No Known Allergies    Physical Exam   Vital Signs: Temp: 97.9  F (36.6  C) Temp src: Oral BP: 127/78 Pulse: 74   Resp: 18 SpO2: 96 % O2 Device: None (Room air)    Weight: 220 lbs 10.89 oz    Gen:  Well-developed, well-nourished, in no acute distress, lying semi-supine in hospital stretcher  HEENT:  Anicteric sclera, PER, hearing intact to voice  Resp:  No accessory muscle use, breath sounds clear; no wheezes no rales no rhonchi  Card:  No murmur, normal S1, S2   Abd:  Soft per RN exam, no TTP, non-distended, normoactive bowel sounds are present  Musc:  Normal strength and movement of the major muscle groups without obvious deformity  Psych:  Good insight, oriented to person, place and time, not anxious, not agitated    Data     No lab results found in last 7 days.      No results found for this or any previous visit (from the past 24 hour(s)).